# Patient Record
Sex: MALE | Employment: OTHER | URBAN - METROPOLITAN AREA
[De-identification: names, ages, dates, MRNs, and addresses within clinical notes are randomized per-mention and may not be internally consistent; named-entity substitution may affect disease eponyms.]

---

## 2021-03-30 ENCOUNTER — OFFICE VISIT (OUTPATIENT)
Dept: OBGYN CLINIC | Facility: CLINIC | Age: 61
End: 2021-03-30
Payer: COMMERCIAL

## 2021-03-30 ENCOUNTER — APPOINTMENT (OUTPATIENT)
Dept: RADIOLOGY | Facility: CLINIC | Age: 61
End: 2021-03-30
Payer: COMMERCIAL

## 2021-03-30 VITALS
BODY MASS INDEX: 36.84 KG/M2 | SYSTOLIC BLOOD PRESSURE: 144 MMHG | WEIGHT: 278 LBS | RESPIRATION RATE: 20 BRPM | HEART RATE: 102 BPM | DIASTOLIC BLOOD PRESSURE: 82 MMHG | HEIGHT: 73 IN

## 2021-03-30 DIAGNOSIS — M25.562 LEFT KNEE PAIN, UNSPECIFIED CHRONICITY: ICD-10-CM

## 2021-03-30 DIAGNOSIS — M76.52 PATELLAR TENDONITIS OF LEFT KNEE: ICD-10-CM

## 2021-03-30 DIAGNOSIS — M25.562 LEFT KNEE PAIN, UNSPECIFIED CHRONICITY: Primary | ICD-10-CM

## 2021-03-30 PROCEDURE — 73560 X-RAY EXAM OF KNEE 1 OR 2: CPT

## 2021-03-30 PROCEDURE — 73564 X-RAY EXAM KNEE 4 OR MORE: CPT

## 2021-03-30 PROCEDURE — 99203 OFFICE O/P NEW LOW 30 MIN: CPT | Performed by: ORTHOPAEDIC SURGERY

## 2021-03-30 RX ORDER — IBUPROFEN 800 MG/1
800 TABLET ORAL EVERY 8 HOURS PRN
Qty: 30 TABLET | Refills: 0 | Status: SHIPPED | OUTPATIENT
Start: 2021-03-30

## 2021-03-31 NOTE — PROGRESS NOTES
Patient Name:  Jacinto Gomez  MRN:  [id number]    [address] [place], [place] [address]     1  Left knee pain, unspecified chronicity  -     XR knee 4+ vw left injury; Future; Expected date: 03/30/2021  -     Ambulatory referral to Physical Therapy; Future  -     ibuprofen (MOTRIN) 800 mg tablet; Take 1 tablet (800 mg total) by mouth every 8 (eight) hours as needed for mild pain    2  Patellar tendonitis of left knee  -     Ambulatory referral to Physical Therapy; Future  -     ibuprofen (MOTRIN) 800 mg tablet; Take 1 tablet (800 mg total) by mouth every 8 (eight) hours as needed for mild pain        Patient has symptomatic left knee patellar tendinitis  He was given a prescription to begin formal physical therapy to work on stretching and strengthening exercises  I also wrote a prescription for ibuprofen 800 mg to be taken as needed up to every 8 hours  Informed patient that he must take the medicine with food and should drink plenty of water  I also informed him that he should not take this medicine consistently to the maximum dose for more than a few days due to possible gastrointestinal irritation  The patient understands  He should avoid deep knee bending activities aggravate his pain  I will see the patient back in 6 weeks for repeat evaluation of symptoms  Chief Complaint     Left knee pain    History of the Present Illness     Jacinto Gomez is a 64 y o  male with left knee pain that began about 6 weeks ago  The patient states that he 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 [place] and frequently kneeling  After that time he developed significant pain over the front of his knee  Pain is worse with activity and deep knee bending  He denies any instability or giving way  He denies any swelling about his knee  No other new complaints  Review of Systems     Review of Systems   Constitutional: Negative for appetite change and unexpected weight change  HENT: Negative for congestion and trouble swallowing      Eyes: Negative for visual disturbance  Respiratory: Negative for cough and shortness of breath  Cardiovascular: Negative for chest pain and palpitations  Gastrointestinal: Negative for nausea and vomiting  Endocrine: Negative for cold intolerance and heat intolerance  Musculoskeletal: Negative for gait problem and myalgias  Skin: Negative for rash  Neurological: Negative for numbness  Physical Exam     /82   Pulse 102   Resp 20   Ht 6' 1" (1 854 m)   Wt 126 kg (278 lb)   BMI 36 68 kg/m²     Range of motion from 0 to 110 ° with pain at terminal flexion over the patellar tendon  There is no crepitus with range of motion  There is no effusion  There is no tenderness over the medial or lateral joint line  There is tenderness over the distal patellar tendon extending into the tibial tuberosity insertion point  No palpable defect appreciated  There is 4+/5 quadriceps strength limited by pain and normal tone  The patient can perform a straight leg raise  Anterior and posterior drawer tests are negative  There is no varus or valgus instability  Daniel's testing is negative  The patient is neurovascular intact distally  Eyes:  Anicteric sclerae  Neck:  Supple  Lungs:  Normal respiratory effort  Cardiovascular:  Capillary refill is less than 2 seconds  Skin:  Intact without erythema  Neurologic:  Sensation grossly intact to light touch  Psychiatric:  Mood and affect are appropriate  Data Review     I have personally reviewed pertinent films in PACS, and my interpretation follows:    X-rays left knee show no fracture dislocation  Joint spaces are relatively well maintained with subtle narrowing of the medial compartment joint space  History reviewed  No pertinent past medical history  History reviewed  No pertinent surgical history  No Known Allergies    No current outpatient medications on file prior to visit       No current facility-administered medications on file prior to visit          Social History     Tobacco Use    Smoking status: Former Smoker    Smokeless tobacco: Never Used   Substance Use Topics    Alcohol use: Not Currently    Drug use: Not Currently       Family History   Problem Relation Age of Onset    No Known Problems Mother     No Known Problems Father              Procedures Performed     Procedures        Jeanie Hinojosa DO

## 2021-04-14 ENCOUNTER — EVALUATION (OUTPATIENT)
Dept: PHYSICAL THERAPY | Facility: CLINIC | Age: 61
End: 2021-04-14
Payer: COMMERCIAL

## 2021-04-14 DIAGNOSIS — M76.52 PATELLAR TENDONITIS OF LEFT KNEE: ICD-10-CM

## 2021-04-14 DIAGNOSIS — M25.562 LEFT KNEE PAIN, UNSPECIFIED CHRONICITY: ICD-10-CM

## 2021-04-14 PROCEDURE — 97110 THERAPEUTIC EXERCISES: CPT | Performed by: PHYSICAL THERAPIST

## 2021-04-14 PROCEDURE — 97140 MANUAL THERAPY 1/> REGIONS: CPT | Performed by: PHYSICAL THERAPIST

## 2021-04-14 PROCEDURE — 97161 PT EVAL LOW COMPLEX 20 MIN: CPT | Performed by: PHYSICAL THERAPIST

## 2021-04-14 NOTE — PROGRESS NOTES
PT Evaluation     Today's date: 2021  Patient name: Raheel Kramer  : 1960  MRN: [id number]  Referring provider: Lucero Mayes DO  Dx:   Encounter Diagnosis     ICD-10-CM    1  Left knee pain, unspecified chronicity  M25 562 Ambulatory referral to Physical Therapy   2  Patellar tendonitis of left knee  M76 52 Ambulatory referral to Physical Therapy                  Assessment  Assessment details: Raheel Kramer is a pleasant 64 y o  presenting to physical therapy with MD referral for Left knee pain, unspecified chronicity and  Patellar tendonitis of left knee  Problem list:  Limited knee ROM, decreased hip/core strength, limited lower extremity flexibility, and poor squatting mechanics  Treatment to include: Manual therapy techniques, lower extremity/core strengthening, neuromuscular control exercises, balance/proprioception training, IASTM, instruction in a comprehensive HEP, and modalities as needed  This pt would benefit from skilled PT services to address their impairments and functional limitations to maximize functional outcome  Barriers to therapy: BMI > 35  Understanding of Dx/Px/POC: good   Prognosis: good    Goals  ST  Pt will improve knee flexion to at least 128 degrees in 3 weeks  2  Pt will improve SLS to at least 10 seconds in 3 weeks  LT  Pt will be able to descend stairs with a reciprocal pattern with minimal to no pain in 6 weeks  2  Pt will be independent in a comprehensive HEP in 6 weeks  Plan  Plan details: 1 x per week due to high copay  Patient would benefit from: skilled physical therapy  Frequency: 1x week  Duration in weeks: 6  Treatment plan discussed with: patient        Subjective Evaluation    History of Present Illness  Mechanism of injury: Pt reports he began to experience knee pain 2 months ago after driving down to [place] and 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  Pt reports after the repetitive kneeling he began to experience increased knee pain   Pt reports saw ortho who diagnosed pt with patellar tendonitis and prescribed pt ibuprofen and referred to PT  Pt reports he took the ibuprofen for 2 days which relieved majority of symptoms; however, he reports he continues to experience significant pain and difficulty descending stairs  Premorbid status:  - ADLs: Independent with no difficulty  - Work: Not a working individual  - Recreation:none ([employer] work at home)    Current status:  - ADLs/Functional activities:   - Stairs Step to pattern with Pain Levels: no pain, moderate/severe pain to descend reciprocally   - Sit to stand with mild pain   - Walking unlimited without pain   - Standing unlimited without pain  - Work: Not a working individual- retired ( work at his home)  - Recreation: none  Pain  Current pain ratin  At best pain ratin  At worst pain rating: 3  Location: patellar tendon  Quality: dull ache  Relieving factors: ice and medications  Aggravating factors: stair climbing  Progression: improved      Diagnostic Tests  X-ray: normal        Objective     Observations     Additional Observation Details  Pt has red wound approximately size of a quarter which he reports is due to frost bite from icing his knee  Palpation     Additional Palpation Details  TTP over patellar tendon on LLE, no tenderness anywhere else  Active Range of Motion   Left Knee   Flexion: 122 degrees   Extension: 0 degrees     Right Knee   Flexion: 132 degrees   Extension: 0 degrees     Passive Range of Motion   Left Knee   Flexion: 126 degrees     Mobility   Patellar Mobility:   Left Knee   WFL: medial, lateral, superior and inferior       Right Knee   WFL: medial, lateral, superior and inferior    Strength/Myotome Testing     Left Hip   Planes of Motion   Flexion: 5  External rotation: 4    Right Hip   Planes of Motion   Flexion: 5  External rotation: 4    Left Knee   Flexion: 5  Extension: 5    Right Knee   Flexion: 5  Extension: 4 (pain at patellar tendon)    Left Ankle/Foot   Dorsiflexion: 5  Plantar flexion: 5    Right Ankle/Foot   Dorsiflexion: 5  Plantar flexion: 5    Additional Strength Details  SLS L: 6 "  SLS R: 30 "    Squat: to 60 degrees knee flexion with moderate lateral shift to the right    Flexibility:  - HS: mod restriction B  - gastroc: mod restrictionB    Tests     Left Knee   Negative anterior drawer, posterior drawer, valgus stress test at 0 degrees, valgus stress test at 30 degrees and varus stress test at 0 degrees  Right Knee   Negative anterior drawer, posterior drawer, valgus stress test at 0 degrees, valgus stress test at 30 degrees, varus stress test at 0 degrees and varus stress test at 30 degrees  Precautions: none (1 x per week due to high copay- be sure to update HEP each visit and progress)      Manuals 4-14 (IE)            IASTM to distal quad and patellar tendon JG                                                   Neuro Re-Ed             SLS 3 x 15" NV            Rockerboard A/P, M/L 20 taps, 30" hold NV                                                                             Ther Ex             Upright bike 6 mins NV                         Standing:             - Rockerboard gastroc stretch 4 x 30" NV            - hip 3 way TB 2 x 10 ea RTB NV                         Seated:             - LAQ (45-90 deg) 2 x 10 5# NV                         Prone quad stretch 4 x 30" NV                                                   Ther Activity             - TG eccentric control 2 x 10 L22             - TG single leg squat (up with 2, down with L) 2 x10 L18 NV            - lateral heel taps 2 x 10 4" step NV            - Lateral step up and overs 2 x 10 6" NV                         Gait Training                                       Modalities                                         * On initial evaluation, educated pt on anatomy, pathology, and exercise rationale   Provided pt with basic HEP and ensured proper exercise performance  Educated pt to call with any questions or concerns  Access Code: [email or web address]  URL: [email or web address]  Date: 04/14/2021  Prepared by: Ros[employer] [facility]    Exercises  Prone Quadriceps Stretch with Strap - 3 x daily - 4 reps - 30 seconds hold  Seated Hamstring Stretch - 3 x daily - 4 reps - 30 seconds hold  Gastroc Stretch on Wall - 3 x daily - 4 reps - 30 seconds hold  Seated Long Arc Quad (90-45 Degree Range) - 4 x weekly - 2 sets - 10 reps

## 2021-04-19 NOTE — TELEPHONE ENCOUNTER
Patient is calling for:  PT at [address]       (dept)  Patient was given phone number: [phone]  Patient was then transferred to the above phone number

## 2021-04-21 ENCOUNTER — APPOINTMENT (OUTPATIENT)
Dept: PHYSICAL THERAPY | Facility: CLINIC | Age: 61
End: 2021-04-21
Payer: COMMERCIAL

## 2021-04-28 ENCOUNTER — OFFICE VISIT (OUTPATIENT)
Dept: PHYSICAL THERAPY | Facility: CLINIC | Age: 61
End: 2021-04-28
Payer: COMMERCIAL

## 2021-04-28 DIAGNOSIS — M25.562 LEFT KNEE PAIN, UNSPECIFIED CHRONICITY: ICD-10-CM

## 2021-04-28 DIAGNOSIS — M76.52 PATELLAR TENDONITIS OF LEFT KNEE: Primary | ICD-10-CM

## 2021-04-28 PROCEDURE — 97140 MANUAL THERAPY 1/> REGIONS: CPT | Performed by: PHYSICAL THERAPIST

## 2021-04-28 PROCEDURE — 97110 THERAPEUTIC EXERCISES: CPT | Performed by: PHYSICAL THERAPIST

## 2021-04-28 NOTE — PROGRESS NOTES
Daily Note     Today's date: 2021  Patient name: Nelly Lord  : 1960  MRN: [id number]  Referring provider: Melissa Champion DO  Dx:   Encounter Diagnosis     ICD-10-CM    1  Patellar tendonitis of left knee  M76 52    2  Left knee pain, unspecified chronicity  M25 562                   Subjective: Patient reports he has been in a lot of pain since the night after his initial evaluation  Pt states he felt "great" after the evaluation and the following day he was walking on his property (uneven terrain) and later that night performed his HEP  Pt states he soon began to experience severe pain over his lateral tibiofemoral joint line  Pt states he has been icing his knee and taking ibuprofen since that time with no significant relief  Objective: See treatment diary below      Assessment: Modified planned POC this date secondary to pt's pain levels  Pt presents with positive Apley's compression test with pain over lateral TFJ line  Pt unable to flex far enough to perform McMurrary's testing  Educated pt to call ortho and discuss symptoms to determine if he should be seen soon  Based on objective measurements today, pt presents with signs and symptoms of meniscus tear  Educated pt to perform seated heel slides and discontinue prone quad stretch and LAQ in HEP at this time  Tolerated treatment fair  Patient exhibited good technique with therapeutic exercises and would benefit from continued PT      Plan: Progress treatment as tolerated         Precautions: none (1 x per week due to high copay- be sure to update HEP each visit and progress)      Manuals 4-14 (IE) 4-28           IASTM to distal quad and patellar tendon JG JG           Seated TFJ distraction with flexion  JG                                     Neuro Re-Ed             SLS 3 x 15" NV            Rockerboard A/P, M/L 20 taps, 30" hold NV                                                                             Ther Ex             Upright bike 6 mins NV 6 mins rocking (recumbent this date)                        Standing:             - Rockerboard gastroc stretch 4 x 30" NV 4 x 30"           - hip 3 way TB 2 x 10 ea RTB NV            - hip abduction  3 x 10 LLE only           - hip ext  2 x 10 LLE only                                     Seated:             - LAQ (45-90 deg) 2 x 10 5# NV                         Prone quad stretch 4 x 30" NV            - heel slides on board  2 mins x 6"            Table:             - SLR flexion  2 x 10           - SLR abduc  Unable due to pain                        Ther Activity             - TG eccentric control 2 x 10 L22             - TG single leg squat (up with 2, down with L) 2 x10 L18 NV            - lateral heel taps 2 x 10 4" step NV            - Lateral step up and overs 2 x 10 6" NV                         Gait Training                                       Modalities

## 2021-05-04 ENCOUNTER — OFFICE VISIT (OUTPATIENT)
Dept: OBGYN CLINIC | Facility: CLINIC | Age: 61
End: 2021-05-04
Payer: COMMERCIAL

## 2021-05-04 VITALS
WEIGHT: 294 LBS | SYSTOLIC BLOOD PRESSURE: 124 MMHG | HEIGHT: 73 IN | RESPIRATION RATE: 20 BRPM | HEART RATE: 67 BPM | DIASTOLIC BLOOD PRESSURE: 81 MMHG | BODY MASS INDEX: 38.97 KG/M2

## 2021-05-04 DIAGNOSIS — M76.62 LEFT ACHILLES TENDINITIS: Primary | ICD-10-CM

## 2021-05-04 DIAGNOSIS — M76.52 PATELLAR TENDONITIS OF LEFT KNEE: ICD-10-CM

## 2021-05-04 DIAGNOSIS — M21.42 PES PLANUS OF LEFT FOOT: ICD-10-CM

## 2021-05-04 PROCEDURE — 99213 OFFICE O/P EST LOW 20 MIN: CPT | Performed by: ORTHOPAEDIC SURGERY

## 2021-05-04 NOTE — PROGRESS NOTES
Patient Name:  Jhon Guardado  MRN:  [id number]    [address] [place] [address]     1  Patellar tendonitis of left knee    2  Left Achilles tendinitis    3  Pes planus of left foot          The patient has improving left knee patellar tendinitis with physical therapy  He does have newer onset left Achilles tendinitis and notable pes planus deformity of his left foot  He has significant tightness of his Achilles tendon on physical exam   I have ordered physical therapy to work on Achilles tendon stretching exercises  I have also instructed him to purchase over-the-counter arch supports from his local pharmacy  He should continue to work with physical therapy for his left knee and continue with his home exercises working on range of motion and strengthening  I have also given him home exercises to incorporate Achilles tendon stretches  Will see the patient back in 6-8 weeks for repeat evaluation and consider ankle x-rays at that time  History of the Present Illness   Jhon Guardado is a 64 y o  male  Here for follow-up of left knee pain  He reports worsening of knee pain after overdoing it with physical therapy a couple weeks ago but this has resolved  Currently reports that his pain is improving though he does have some residual tenderness over his patellar tendon  He notes that after his last physical therapy session 1 week ago, after performing increased Achilles stretches he noticed increased tenderness about his Achilles tendon  He denies any popping sensation or giving way  He has tenderness over his Achilles tendon and pain with ambulation  He denies any other new symptoms  Review of Systems     Review of Systems    Physical Exam     /81   Pulse 67   Resp 20   Ht 6' 1" (1 854 m)   Wt 133 kg (294 lb)   BMI 38 79 kg/m²       Left knee: Range of motion from 0 to 115 °  There is no crepitus with range of motion  There is no effusion  There is no tenderness over the medial or lateral joint line  There is tenderness over the distal patellar tendon extending into the tibial tuberosity insertion point  No palpable defect appreciated  There is 4+/5 quadriceps strength limited by pain and  mildly decreased tone  The patient can perform a straight leg raise  Anterior and posterior drawer tests are negative  There is no varus or valgus instability  Daniel's testing is negative  The patient is neurovascular intact distally  Left Foot/Ankle:  Healed scar over lateral   Fibula without erythema or warmth  Active range of motion to neutral dorsiflexion and 45 degrees plantar flexion  Passive dorsiflexion to 5 of dorsiflexion with pain and Achilles tendon  Negative Haynes test   There is tenderness present over the distal Achilles tendon and  insertion  There is  normal range of motion of toes in plantar flexion and dorsiflexion  There is pain with resisted  Plantar flexion  Pes planus deformity noted without tenderness over medial calcaneus or plantar fascia  Anterior drawer test is  negative  Talar tilt test is  negative  Sensation is intact to light touch superficial peroneal, deep peroneal, tibial, saphenous, and sural nerve distributions  2+ DP pulse present  Data Review     I have personally reviewed pertinent films in PACS, and my interpretation follows  no new images reviewed today      Social History     Tobacco Use    Smoking status: Former Smoker    Smokeless tobacco: Never Used   Substance Use Topics    Alcohol use: Not Currently    Drug use: Not Currently           Procedures    Danielle Ear, DO

## 2021-05-05 ENCOUNTER — TELEPHONE (OUTPATIENT)
Dept: PHYSICAL THERAPY | Facility: CLINIC | Age: 61
End: 2021-05-05

## 2021-05-05 NOTE — TELEPHONE ENCOUNTER
Called pt to notify he missed his 10:30am appt  Pt states his achilles is very sore so he would like to take the week off and do the RE next week to add his ankle to POC  Educated pt to make sure he is not stretching to the point of pain, rather only to gentle stretch sensation  Pt verbalized understanding

## 2021-05-12 ENCOUNTER — APPOINTMENT (OUTPATIENT)
Dept: PHYSICAL THERAPY | Facility: CLINIC | Age: 61
End: 2021-05-12
Payer: COMMERCIAL

## 2021-05-19 ENCOUNTER — EVALUATION (OUTPATIENT)
Dept: PHYSICAL THERAPY | Facility: CLINIC | Age: 61
End: 2021-05-19
Payer: COMMERCIAL

## 2021-05-19 DIAGNOSIS — M76.52 PATELLAR TENDONITIS OF LEFT KNEE: Primary | ICD-10-CM

## 2021-05-19 DIAGNOSIS — M25.562 LEFT KNEE PAIN, UNSPECIFIED CHRONICITY: ICD-10-CM

## 2021-05-19 PROCEDURE — 97110 THERAPEUTIC EXERCISES: CPT | Performed by: PHYSICAL THERAPIST

## 2021-05-19 PROCEDURE — 97140 MANUAL THERAPY 1/> REGIONS: CPT | Performed by: PHYSICAL THERAPIST

## 2021-05-19 NOTE — PROGRESS NOTES
PT Re-Evaluation     Today's date: 2021  Patient name: Tammy Mahajan  : 1960  MRN: [id number]  Referring provider: Yovani Valle DO  Dx:   Encounter Diagnosis     ICD-10-CM    1  Patellar tendonitis of left knee  M76 52    2  Left knee pain, unspecified chronicity  M25 562                   Assessment  Assessment details: Tammy Mahajan is a pleasant 64 y o  presenting to physical therapy with MD referral for Left knee pain, unspecified chronicity and Patellar tendonitis of left knee  Since time of initial evaluation, pt has attended 3 total sessions including initial evaluation and this session  Pt has not made improvements in knee ROM, rather, pt's knee pain has increased and his gait is more antalgic  Problem list:  Limited knee ROM, decreased hip/core strength, limited lower extremity flexibility, and poor squatting mechanics  Treatment to include: Manual therapy techniques, lower extremity/core strengthening, neuromuscular control exercises, balance/proprioception training, IASTM, instruction in a comprehensive HEP, and modalities as needed  This pt would benefit from skilled PT services to address their impairments and functional limitations to maximize functional outcome  Barriers to therapy: BMI > 35  Understanding of Dx/Px/POC: good   Prognosis: good    Goals  ST  Pt will improve knee flexion to at least 128 degrees in 3 weeks  NOT MET  2  Pt will improve SLS to at least 10 seconds in 3 weeks  NOT MET    LT  Pt will be able to descend stairs with a reciprocal pattern with minimal to no pain in 6 weeks  NOT MET  2  Pt will be independent in a comprehensive HEP in 6 weeks   NOT MET    Plan  Plan details: 1 x per week due to high copay  Patient would benefit from: skilled physical therapy  Frequency: 1x week  Duration in weeks: 6  Treatment plan discussed with: patient        Subjective Evaluation    History of Present Illness  Mechanism of injury: IE: Pt reports he began to experience knee pain 2 months ago after driving down to [place] and 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  Pt reports after the repetitive kneeling he began to experience increased knee pain  Pt reports saw ortho who diagnosed pt with patellar tendonitis and prescribed pt ibuprofen and referred to PT  Pt reports he took the ibuprofen for 2 days which relieved majority of symptoms; however, he reports he continues to experience significant pain and difficulty descending stairs  Premorbid status:  - ADLs: Independent with no difficulty  - Work: Not a working individual  - Recreation:none ([employer] work at home)    Current status:  - ADLs/Functional activities:   - Stairs Step to pattern with mod/severe pain (worse)   - Sit to stand with mod/severe pain (worse)   - Walking 100' prior to increase in pain (worse)   - Unable to walk downhill (worse)   - Standing unlimited without pain (same)  - Work: Not a working individual- retired ([employer] work at his home)  - Recreation: none    Since time of initial evaluation, pt experienced significant increase in pain after walking over his property on a slope and then performing HEP  Pt rested for 1 week and the pain reduced  After first PT follow-up, pt developed pain at his achilles  Pt had ortho follow up appointment and ortho diagnosed pt with achilles tendonitis and referred pt to PT to have ankle added to POC  Pt states ankle pain has resolved and he does not want it assessed today  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 and his knee pain has now greatly increased  Pt states increased knee pain after prolonged sitting  Pt denies knee locking, buckling, or giving way  Pt does report non-painful clicking in knee  Pt states his knee pain is located along patellar tendon and lateral aspect of knee     Pain  Current pain ratin  At best pain ratin  At worst pain ratin  Location: patellar tendon  Quality: dull ache  Relieving factors: ice and medications  Aggravating factors: stair climbing  Progression: improved      Diagnostic Tests  X-ray: normal  Treatments  Previous treatment: physical therapy and medication  Current treatment: physical therapy  Patient Goals  Patient goals for therapy: decreased pain          Objective     Palpation     Additional Palpation Details  TTP over patellar tendon on LLE and lateral TFJ joint line  Active Range of Motion   Left Knee   Flexion: 95 degrees   Extension: 0 degrees     Right Knee   Flexion: 132 degrees   Extension: 0 degrees     Passive Range of Motion   Left Knee   Flexion: 98 degrees     Mobility   Patellar Mobility:   Left Knee   WFL: medial, lateral, superior and inferior  Right Knee   WFL: medial, lateral, superior and inferior    Strength/Myotome Testing     Left Hip   Planes of Motion   Flexion: 5  External rotation: 4+    Right Hip   Planes of Motion   Flexion: 5  External rotation: 4    Left Knee   Flexion: 4+ (pain at anterior knee)  Extension: 4+ (pain at anterior knee)    Right Knee   Flexion: 5  Extension: 4    Left Ankle/Foot   Dorsiflexion: 5  Plantar flexion: 5    Right Ankle/Foot   Dorsiflexion: 5  Plantar flexion: 5    Additional Strength Details  SLS L: 12 "  SLS R: 30 "    Squat: to 30 degrees knee flexion with mild lateral shift to the right (less flexion than IE)    Flexibility:  - HS: mod restriction B  - gastroc: mod restriction B    Pt ambulates over level surface with no assistive device with a mildly antalgic gait at a slow pace  Tests     Left Knee   Positive lateral Daniel, medial Daniel, varus stress test at 0 degrees and varus stress test at 30 degrees  Negative anterior drawer, Apley's compression, Apley's distraction, posterior drawer, valgus stress test at 0 degrees and valgus stress test at 30 degrees       Right Knee   Negative anterior drawer, posterior drawer, valgus stress test at 0 degrees, valgus stress test at 30 degrees, varus stress test at 0 degrees and varus stress test at 30 degrees                Precautions: none (1 x per week due to high copay- be sure to update HEP each visit and progress)        Manuals 4-14 (IE) 4-28  5-19 (RE)                 IASTM to distal quad and patellar tendon JG JG  JG                 Seated TFJ distraction with flexion   JG                    Massage roller to left quad     JG                                         Neuro Re-Ed                       SLS 3 x 15" NV                     Rockerboard A/P, M/L 20 taps, 30" hold NV                                                                                                                                             Ther Ex                       Upright bike 6 mins NV 6 mins rocking (recumbent this date)  cking (recumbent this date)                                         Standing:                       - Rockerboard gastroc stretch 4 x 30" NV 4 x 30"  NV                 - hip 3 way TB 2 x 10 ea RTB NV                     - hip abduction   3 x 10 LLE only  NV                 - hip ext   2 x 10 LLE only  NV                                                                 Seated:                       - LAQ (45-90 deg) 2 x 10 5# NV                                             Prone quad stretch 4 x 30" NV                     - heel slides on board   2 mins x 6"   NV                 Table:                       - SLR flexion   2 x 10  NV                 - SLR abduc   Unable due to pain  NV                                         Ther Activity                       - TG eccentric control 2 x 10 L22                      - TG single leg squat (up with 2, down with L) 2 x10 L18 NV                     - lateral heel taps 2 x 10 4" step NV                     - Lateral step up and overs 2 x 10 6" NV                                             Gait Training                                                                       Modalities

## 2021-05-26 ENCOUNTER — OFFICE VISIT (OUTPATIENT)
Dept: PHYSICAL THERAPY | Facility: CLINIC | Age: 61
End: 2021-05-26
Payer: COMMERCIAL

## 2021-05-26 DIAGNOSIS — M25.562 LEFT KNEE PAIN, UNSPECIFIED CHRONICITY: ICD-10-CM

## 2021-05-26 DIAGNOSIS — M76.52 PATELLAR TENDONITIS OF LEFT KNEE: Primary | ICD-10-CM

## 2021-05-26 PROCEDURE — 97140 MANUAL THERAPY 1/> REGIONS: CPT

## 2021-05-26 PROCEDURE — 97530 THERAPEUTIC ACTIVITIES: CPT

## 2021-05-26 PROCEDURE — 97110 THERAPEUTIC EXERCISES: CPT

## 2021-05-26 NOTE — PROGRESS NOTES
Daily Note     Today's date: 2021  Patient name: Santhosh Cates  : 1960  MRN: [id number]  Referring provider: Marisol Cassidy DO  Dx:   Encounter Diagnosis     ICD-10-CM    1  Patellar tendonitis of left knee  M76 52    2  Left knee pain, unspecified chronicity  M25 562        Start Time: 1043  Stop Time: 1123  Total time in clinic (min): 40 minutes    Subjective: Pt reported his knee feels a lot better than last time but still has some p!        Objective: See treatment diary below      Assessment:  Continued with treatment session, Tolerated treatment fair  Patient demonstrated fatigue post treatment, exhibited good technique with therapeutic exercises and would benefit from continued PT  S/p treatment session patient reported feeling good  Educated patient in DOMS due to progressed program today  Plan: Continue per plan of care        Precautions: none (1 x per week due to high copay- be sure to update HEP each visit and progress)        Manuals 4-14 (IE) 4-28  5-19 (RE)                 IASTM to distal quad and patellar tendon JG JG  JG  SC               Seated TFJ distraction with flexion   JG                    Massage roller to left quad     JG  SC                                       Neuro Re-Ed                       SLS 3 x 15" NV                     Rockerboard A/P, M/L 20 taps, 30" hold NV                                                                                                                                             Ther Ex                       Upright bike 6 mins NV 6 mins rocking (recumbent this date)  cking (recumbent this date)  8 min full rotation                                       Standing:                       - Rockerboard gastroc stretch 4 x 30" NV 4 x 30"  NV  30" x 4                - hip 3 way TB 2 x 10 ea RTB NV                     - hip abduction   3 x 10 LLE only  NV  2x 10                - hip ext   2 x 10 LLE only  NV  2x 10                                                                Seated:                       - LAQ (45-90 deg) 2 x 10 5# NV                                             Prone quad stretch 4 x 30" NV                     - heel slides on board   2 mins x 6"   NV                 Table:                       - SLR flexion   2 x 10  NV  2x 10 3" hold                - SLR abduc   Unable due to pain  NV  2x 10                                        Ther Activity                       - TG eccentric control 2 x 10 L22                      - TG single leg squat (up with 2, down with L) 2 x10 L18 NV                     - lateral heel taps 2 x 10 4" step NV                     - Lateral step up and overs 2 x 10 6" NV                      FSU        2x 10 6"                Gait Training                                                                       Modalities                                                                         1 on 1 time for 31 minutes on 5/26/21  All other time independent exercises

## 2021-06-09 NOTE — PROGRESS NOTES
Addendum 6-9-21: Resolved episode of care secondary to phone conversation with pt stating he feels much better and would like to discharge from PT   Pt attained FOTO outcome goal